# Patient Record
Sex: FEMALE | Race: OTHER | NOT HISPANIC OR LATINO
[De-identification: names, ages, dates, MRNs, and addresses within clinical notes are randomized per-mention and may not be internally consistent; named-entity substitution may affect disease eponyms.]

---

## 2021-04-28 ENCOUNTER — TRANSCRIPTION ENCOUNTER (OUTPATIENT)
Age: 26
End: 2021-04-28

## 2022-01-06 ENCOUNTER — APPOINTMENT (OUTPATIENT)
Dept: OTOLARYNGOLOGY | Facility: CLINIC | Age: 27
End: 2022-01-06

## 2022-01-14 ENCOUNTER — ASOB RESULT (OUTPATIENT)
Age: 27
End: 2022-01-14

## 2022-01-14 ENCOUNTER — APPOINTMENT (OUTPATIENT)
Dept: ANTEPARTUM | Facility: CLINIC | Age: 27
End: 2022-01-14
Payer: COMMERCIAL

## 2022-01-14 PROBLEM — Z00.00 ENCOUNTER FOR PREVENTIVE HEALTH EXAMINATION: Status: ACTIVE | Noted: 2022-01-14

## 2022-01-14 PROCEDURE — 76801 OB US < 14 WKS SINGLE FETUS: CPT

## 2022-01-14 PROCEDURE — 76813 OB US NUCHAL MEAS 1 GEST: CPT

## 2022-01-14 PROCEDURE — 36416 COLLJ CAPILLARY BLOOD SPEC: CPT

## 2022-03-12 ENCOUNTER — APPOINTMENT (OUTPATIENT)
Dept: ANTEPARTUM | Facility: CLINIC | Age: 27
End: 2022-03-12
Payer: COMMERCIAL

## 2022-03-12 ENCOUNTER — ASOB RESULT (OUTPATIENT)
Age: 27
End: 2022-03-12

## 2022-03-12 PROCEDURE — 76811 OB US DETAILED SNGL FETUS: CPT

## 2022-03-29 ENCOUNTER — APPOINTMENT (OUTPATIENT)
Dept: ANTEPARTUM | Facility: CLINIC | Age: 27
End: 2022-03-29
Payer: COMMERCIAL

## 2022-03-29 ENCOUNTER — ASOB RESULT (OUTPATIENT)
Age: 27
End: 2022-03-29

## 2022-03-29 PROCEDURE — 76816 OB US FOLLOW-UP PER FETUS: CPT

## 2022-06-16 ENCOUNTER — APPOINTMENT (OUTPATIENT)
Dept: ANTEPARTUM | Facility: CLINIC | Age: 27
End: 2022-06-16
Payer: COMMERCIAL

## 2022-06-16 ENCOUNTER — ASOB RESULT (OUTPATIENT)
Age: 27
End: 2022-06-16

## 2022-06-16 PROCEDURE — 99202 OFFICE O/P NEW SF 15 MIN: CPT | Mod: 25

## 2022-06-16 PROCEDURE — 76816 OB US FOLLOW-UP PER FETUS: CPT

## 2022-06-16 PROCEDURE — 76819 FETAL BIOPHYS PROFIL W/O NST: CPT

## 2022-07-21 ENCOUNTER — INPATIENT (INPATIENT)
Facility: HOSPITAL | Age: 27
LOS: 4 days | Discharge: HOME | End: 2022-07-26
Attending: OBSTETRICS & GYNECOLOGY | Admitting: OBSTETRICS & GYNECOLOGY

## 2022-07-21 ENCOUNTER — EMERGENCY (EMERGENCY)
Facility: HOSPITAL | Age: 27
LOS: 1 days | Discharge: STILL ADMITTED OR ADMINISTRATIVE | End: 2022-07-21
Admitting: EMERGENCY MEDICINE

## 2022-07-21 VITALS
DIASTOLIC BLOOD PRESSURE: 83 MMHG | RESPIRATION RATE: 16 BRPM | SYSTOLIC BLOOD PRESSURE: 134 MMHG | HEART RATE: 79 BPM | OXYGEN SATURATION: 100 % | TEMPERATURE: 97 F

## 2022-07-21 VITALS
DIASTOLIC BLOOD PRESSURE: 84 MMHG | HEART RATE: 78 BPM | RESPIRATION RATE: 16 BRPM | SYSTOLIC BLOOD PRESSURE: 141 MMHG | TEMPERATURE: 98 F

## 2022-07-21 DIAGNOSIS — Z3A.00 WEEKS OF GESTATION OF PREGNANCY NOT SPECIFIED: ICD-10-CM

## 2022-07-21 DIAGNOSIS — O26.899 OTHER SPECIFIED PREGNANCY RELATED CONDITIONS, UNSPECIFIED TRIMESTER: ICD-10-CM

## 2022-07-21 PROCEDURE — L9996: CPT

## 2022-07-21 NOTE — ED ADULT TRIAGE NOTE - CHIEF COMPLAINT QUOTE
Patient 41 weeks pregnant c/o decrease in fetal movement. EMMANUEL 7/19/22. [facility]. Scheduled for induction Saturday. Denies ROM. Per L&D triage CRISTIANE England, patient to be seen in L&D.

## 2022-07-21 NOTE — OB RN TRIAGE NOTE - CHIEF COMPLAINT QUOTE
"I have numbness and pain in my right hand and shoulder for 2 weeks and I've had decreased fetal movement for the past week"

## 2022-07-22 ENCOUNTER — TRANSCRIPTION ENCOUNTER (OUTPATIENT)
Age: 27
End: 2022-07-22

## 2022-07-22 DIAGNOSIS — O41.00X0 OLIGOHYDRAMNIOS, UNSPECIFIED TRIMESTER, NOT APPLICABLE OR UNSPECIFIED: ICD-10-CM

## 2022-07-22 DIAGNOSIS — O41.03X0 OLIGOHYDRAMNIOS, THIRD TRIMESTER, NOT APPLICABLE OR UNSPECIFIED: ICD-10-CM

## 2022-07-22 LAB
BASOPHILS # BLD AUTO: 0.02 K/UL — SIGNIFICANT CHANGE UP (ref 0–0.2)
BASOPHILS NFR BLD AUTO: 0.2 % — SIGNIFICANT CHANGE UP (ref 0–2)
BLD GP AB SCN SERPL QL: NEGATIVE — SIGNIFICANT CHANGE UP
COVID-19 SPIKE DOMAIN AB INTERP: POSITIVE
COVID-19 SPIKE DOMAIN ANTIBODY RESULT: >250 U/ML — HIGH
EOSINOPHIL # BLD AUTO: 0.19 K/UL — SIGNIFICANT CHANGE UP (ref 0–0.5)
EOSINOPHIL NFR BLD AUTO: 2 % — SIGNIFICANT CHANGE UP (ref 0–6)
HCT VFR BLD CALC: 37.9 % — SIGNIFICANT CHANGE UP (ref 34.5–45)
HGB BLD-MCNC: 12.5 G/DL — SIGNIFICANT CHANGE UP (ref 11.5–15.5)
IANC: 5.86 K/UL — SIGNIFICANT CHANGE UP (ref 1.8–7.4)
IMM GRANULOCYTES NFR BLD AUTO: 0.5 % — SIGNIFICANT CHANGE UP (ref 0–1.5)
LYMPHOCYTES # BLD AUTO: 2.71 K/UL — SIGNIFICANT CHANGE UP (ref 1–3.3)
LYMPHOCYTES # BLD AUTO: 28.5 % — SIGNIFICANT CHANGE UP (ref 13–44)
MCHC RBC-ENTMCNC: 28.5 PG — SIGNIFICANT CHANGE UP (ref 27–34)
MCHC RBC-ENTMCNC: 33 GM/DL — SIGNIFICANT CHANGE UP (ref 32–36)
MCV RBC AUTO: 86.5 FL — SIGNIFICANT CHANGE UP (ref 80–100)
MONOCYTES # BLD AUTO: 0.68 K/UL — SIGNIFICANT CHANGE UP (ref 0–0.9)
MONOCYTES NFR BLD AUTO: 7.2 % — SIGNIFICANT CHANGE UP (ref 2–14)
NEUTROPHILS # BLD AUTO: 5.86 K/UL — SIGNIFICANT CHANGE UP (ref 1.8–7.4)
NEUTROPHILS NFR BLD AUTO: 61.6 % — SIGNIFICANT CHANGE UP (ref 43–77)
NRBC # BLD: 0 /100 WBCS — SIGNIFICANT CHANGE UP
NRBC # FLD: 0 K/UL — SIGNIFICANT CHANGE UP
PLATELET # BLD AUTO: 191 K/UL — SIGNIFICANT CHANGE UP (ref 150–400)
RBC # BLD: 4.38 M/UL — SIGNIFICANT CHANGE UP (ref 3.8–5.2)
RBC # FLD: 14.6 % — HIGH (ref 10.3–14.5)
RH IG SCN BLD-IMP: POSITIVE — SIGNIFICANT CHANGE UP
RH IG SCN BLD-IMP: POSITIVE — SIGNIFICANT CHANGE UP
SARS-COV-2 IGG+IGM SERPL QL IA: >250 U/ML — HIGH
SARS-COV-2 IGG+IGM SERPL QL IA: POSITIVE
SARS-COV-2 RNA SPEC QL NAA+PROBE: SIGNIFICANT CHANGE UP
T PALLIDUM AB TITR SER: NEGATIVE — SIGNIFICANT CHANGE UP
WBC # BLD: 9.51 K/UL — SIGNIFICANT CHANGE UP (ref 3.8–10.5)
WBC # FLD AUTO: 9.51 K/UL — SIGNIFICANT CHANGE UP (ref 3.8–10.5)

## 2022-07-22 RX ORDER — CITRIC ACID/SODIUM CITRATE 300-500 MG
15 SOLUTION, ORAL ORAL EVERY 6 HOURS
Refills: 0 | Status: DISCONTINUED | OUTPATIENT
Start: 2022-07-22 | End: 2022-07-23

## 2022-07-22 RX ORDER — OXYTOCIN 10 UNIT/ML
333.33 VIAL (ML) INJECTION
Qty: 20 | Refills: 0 | Status: DISCONTINUED | OUTPATIENT
Start: 2022-07-22 | End: 2022-07-25

## 2022-07-22 RX ORDER — CHLORHEXIDINE GLUCONATE 213 G/1000ML
1 SOLUTION TOPICAL ONCE
Refills: 0 | Status: COMPLETED | OUTPATIENT
Start: 2022-07-22 | End: 2022-07-23

## 2022-07-22 RX ORDER — SODIUM CHLORIDE 9 MG/ML
1000 INJECTION, SOLUTION INTRAVENOUS
Refills: 0 | Status: DISCONTINUED | OUTPATIENT
Start: 2022-07-22 | End: 2022-07-23

## 2022-07-22 RX ORDER — OXYTOCIN 10 UNIT/ML
2 VIAL (ML) INJECTION
Qty: 30 | Refills: 0 | Status: DISCONTINUED | OUTPATIENT
Start: 2022-07-22 | End: 2022-07-23

## 2022-07-22 RX ADMIN — SODIUM CHLORIDE 125 MILLILITER(S): 9 INJECTION, SOLUTION INTRAVENOUS at 07:50

## 2022-07-22 RX ADMIN — SODIUM CHLORIDE 125 MILLILITER(S): 9 INJECTION, SOLUTION INTRAVENOUS at 02:31

## 2022-07-22 RX ADMIN — Medication 2 MILLIUNIT(S)/MIN: at 18:14

## 2022-07-22 NOTE — OB RN PATIENT PROFILE - NS PRO TDAP CONTRAINDICATIONS
12 y/o F presents to the ED c/o cough. Pt states she was drinking milk and then started coughing. Father states cough sounded like she was barking and her breathing seemed labored. Pt was seen at urgent care earlier and given benadryl. Pt has no known allergies. No fever, chills, n/v/d.
None

## 2022-07-22 NOTE — OB RN PATIENT PROFILE - FALL HARM RISK - UNIVERSAL INTERVENTIONS
Bed in lowest position, wheels locked, appropriate side rails in place/Call bell, personal items and telephone in reach/Instruct patient to call for assistance before getting out of bed or chair/Non-slip footwear when patient is out of bed/[place] to call system/Physically safe environment - no spills, clutter or unnecessary equipment/Purposeful Proactive Rounding/Room/bathroom lighting operational, light cord in reach

## 2022-07-22 NOTE — OB PROVIDER H&P - NSHPPHYSICALEXAM_GEN_ALL_CORE
T(C): 36.8 (07-22-22 @ 00:47), Max: 36.8 (07-21-22 @ 21:34)  HR: 73 (07-22-22 @ 00:47) (68 - 79)  BP: 125/83 (07-22-22 @ 00:47) (114/66 - 141/84)  RR: 16 (07-22-22 @ 00:47) (16 - 16)  SpO2: 100% (07-21-22 @ 21:13) (100% - 100%)    Heart: RRR  Lungs: CTA  Abdomen: Gravid, soft, NT    NST: Reactive with moderate variability, Category 1 tracing  [place]: Irregular contractions  VE: 1/70/-3, intact membranes  TAS: SLIUP, Cephalic, NAPOLEON: 2.5

## 2022-07-22 NOTE — OB PROVIDER H&P - PROBLEM SELECTOR PLAN 1
-Admit to labor and delivery for induction of labor  -Pain Management prn  -Cont EFM/[place]  -Admission labs: CBC, RPR, T&S  -IV hydration  -Clear liquid diet  -Cytotec PO for IOL

## 2022-07-22 NOTE — OB PROVIDER IHI INDUCTION/AUGMENTATION NOTE - NS_OBIHIREPANPSATTEST_OBGYN_ALL_OB
I have discussed with the Attending the patient's status, as well as the H&P and the fetal status. The Attending agreed with the suggested management.
No significant past surgical history

## 2022-07-22 NOTE — OB PROVIDER H&P - HISTORY OF PRESENT ILLNESS
27y  at 40w3d presents to triage c/o decreased fetal movements for the past week but patient was evaluated by OB on Monday.  Reports +FM, no vaginal bleeding, no ROM or LOF  Prenatal care: Dr Tompkins, denies any prenatal complications.  GBS: Negative 22

## 2022-07-22 NOTE — OB PROVIDER H&P - ASSESSMENT
27y  at 40w3d with oligohydramnios, NAPOLEON: 2.5   GBS: Negative 22, Repeat GBS done 7/15/22 pending  D/w Dr Tompkins  -Admit to labor and delivery for induction of labor  -Pain Management prn  -Cont EFM/[place]  -Admission labs: CBC, RPR, T&S  -IV hydration  -Clear liquid diet  -Cytotec PO for IOL

## 2022-07-23 ENCOUNTER — TRANSCRIPTION ENCOUNTER (OUTPATIENT)
Age: 27
End: 2022-07-23

## 2022-07-23 RX ORDER — FAMOTIDINE 10 MG/ML
20 INJECTION INTRAVENOUS ONCE
Refills: 0 | Status: COMPLETED | OUTPATIENT
Start: 2022-07-23 | End: 2022-07-23

## 2022-07-23 RX ORDER — OXYTOCIN 10 UNIT/ML
333.33 VIAL (ML) INJECTION
Qty: 20 | Refills: 0 | Status: DISCONTINUED | OUTPATIENT
Start: 2022-07-23 | End: 2022-07-25

## 2022-07-23 RX ORDER — SODIUM CHLORIDE 9 MG/ML
1000 INJECTION INTRAMUSCULAR; INTRAVENOUS; SUBCUTANEOUS
Refills: 0 | Status: DISCONTINUED | OUTPATIENT
Start: 2022-07-23 | End: 2022-07-23

## 2022-07-23 RX ORDER — SODIUM CHLORIDE 9 MG/ML
1000 INJECTION, SOLUTION INTRAVENOUS
Refills: 0 | Status: DISCONTINUED | OUTPATIENT
Start: 2022-07-23 | End: 2022-07-26

## 2022-07-23 RX ORDER — KETOROLAC TROMETHAMINE 30 MG/ML
30 SYRINGE (ML) INJECTION EVERY 6 HOURS
Refills: 0 | Status: DISCONTINUED | OUTPATIENT
Start: 2022-07-23 | End: 2022-07-24

## 2022-07-23 RX ORDER — SIMETHICONE 80 MG/1
80 TABLET, CHEWABLE ORAL EVERY 4 HOURS
Refills: 0 | Status: DISCONTINUED | OUTPATIENT
Start: 2022-07-23 | End: 2022-07-26

## 2022-07-23 RX ORDER — OXYCODONE HYDROCHLORIDE 5 MG/1
5 TABLET ORAL
Refills: 0 | Status: COMPLETED | OUTPATIENT
Start: 2022-07-23 | End: 2022-07-30

## 2022-07-23 RX ORDER — ONDANSETRON 8 MG/1
4 TABLET, FILM COATED ORAL ONCE
Refills: 0 | Status: DISCONTINUED | OUTPATIENT
Start: 2022-07-23 | End: 2022-07-23

## 2022-07-23 RX ORDER — DIPHENHYDRAMINE HCL 50 MG
25 CAPSULE ORAL EVERY 6 HOURS
Refills: 0 | Status: DISCONTINUED | OUTPATIENT
Start: 2022-07-23 | End: 2022-07-26

## 2022-07-23 RX ORDER — ACETAMINOPHEN 500 MG
975 TABLET ORAL
Refills: 0 | Status: DISCONTINUED | OUTPATIENT
Start: 2022-07-23 | End: 2022-07-26

## 2022-07-23 RX ORDER — SODIUM CHLORIDE 9 MG/ML
300 INJECTION INTRAMUSCULAR; INTRAVENOUS; SUBCUTANEOUS ONCE
Refills: 0 | Status: COMPLETED | OUTPATIENT
Start: 2022-07-23 | End: 2022-07-23

## 2022-07-23 RX ORDER — MAGNESIUM HYDROXIDE 400 MG/1
30 TABLET, CHEWABLE ORAL
Refills: 0 | Status: DISCONTINUED | OUTPATIENT
Start: 2022-07-23 | End: 2022-07-26

## 2022-07-23 RX ORDER — OXYCODONE HYDROCHLORIDE 5 MG/1
5 TABLET ORAL ONCE
Refills: 0 | Status: DISCONTINUED | OUTPATIENT
Start: 2022-07-23 | End: 2022-07-26

## 2022-07-23 RX ORDER — CITRIC ACID/SODIUM CITRATE 300-500 MG
30 SOLUTION, ORAL ORAL ONCE
Refills: 0 | Status: COMPLETED | OUTPATIENT
Start: 2022-07-23 | End: 2022-07-23

## 2022-07-23 RX ORDER — HEPARIN SODIUM 5000 [USP'U]/ML
5000 INJECTION INTRAVENOUS; SUBCUTANEOUS EVERY 12 HOURS
Refills: 0 | Status: DISCONTINUED | OUTPATIENT
Start: 2022-07-23 | End: 2022-07-26

## 2022-07-23 RX ORDER — LANOLIN
1 OINTMENT (GRAM) TOPICAL EVERY 6 HOURS
Refills: 0 | Status: DISCONTINUED | OUTPATIENT
Start: 2022-07-23 | End: 2022-07-26

## 2022-07-23 RX ORDER — TETANUS TOXOID, REDUCED DIPHTHERIA TOXOID AND ACELLULAR PERTUSSIS VACCINE, ADSORBED 5; 2.5; 8; 8; 2.5 [IU]/.5ML; [IU]/.5ML; UG/.5ML; UG/.5ML; UG/.5ML
0.5 SUSPENSION INTRAMUSCULAR ONCE
Refills: 0 | Status: DISCONTINUED | OUTPATIENT
Start: 2022-07-23 | End: 2022-07-26

## 2022-07-23 RX ORDER — IBUPROFEN 200 MG
600 TABLET ORAL EVERY 6 HOURS
Refills: 0 | Status: COMPLETED | OUTPATIENT
Start: 2022-07-23 | End: 2023-06-21

## 2022-07-23 RX ORDER — OXYTOCIN 10 UNIT/ML
111 VIAL (ML) INJECTION
Qty: 20 | Refills: 0 | Status: DISCONTINUED | OUTPATIENT
Start: 2022-07-23 | End: 2022-07-25

## 2022-07-23 RX ADMIN — CHLORHEXIDINE GLUCONATE 1 APPLICATION(S): 213 SOLUTION TOPICAL at 08:23

## 2022-07-23 RX ADMIN — SODIUM CHLORIDE 300 MILLILITER(S): 9 INJECTION INTRAMUSCULAR; INTRAVENOUS; SUBCUTANEOUS at 05:00

## 2022-07-23 RX ADMIN — Medication 0.25 MILLIGRAM(S): at 05:34

## 2022-07-23 RX ADMIN — FAMOTIDINE 20 MILLIGRAM(S): 10 INJECTION INTRAVENOUS at 08:20

## 2022-07-23 RX ADMIN — Medication 30 MILLIGRAM(S): at 18:15

## 2022-07-23 RX ADMIN — Medication 30 MILLILITER(S): at 08:50

## 2022-07-23 RX ADMIN — HEPARIN SODIUM 5000 UNIT(S): 5000 INJECTION INTRAVENOUS; SUBCUTANEOUS at 18:15

## 2022-07-23 RX ADMIN — SODIUM CHLORIDE 125 MILLILITER(S): 9 INJECTION INTRAMUSCULAR; INTRAVENOUS; SUBCUTANEOUS at 05:31

## 2022-07-23 RX ADMIN — SIMETHICONE 80 MILLIGRAM(S): 80 TABLET, CHEWABLE ORAL at 22:39

## 2022-07-23 RX ADMIN — Medication 30 MILLIGRAM(S): at 19:52

## 2022-07-23 RX ADMIN — Medication 333 MILLIUNIT(S)/MIN: at 10:11

## 2022-07-23 RX ADMIN — Medication 975 MILLIGRAM(S): at 20:36

## 2022-07-23 RX ADMIN — Medication 975 MILLIGRAM(S): at 21:15

## 2022-07-23 NOTE — OB PROVIDER DELIVERY SUMMARY - NSSELHIDDEN_OBGYN_ALL_OB_FT
[NS_DeliveryRN_OBGYN_ALL_OB_FT:[id number]],[NS_CirculateRN2_OBGYN_ALL_OB_FT:[id number]],[NS_DeliveryAttending1_OBGYN_ALL_OB_FT:[id number]==],[NS_DeliveryAssist1_OBGYN_ALL_OB_FT:[id number]=]

## 2022-07-23 NOTE — OB RN INTRAOPERATIVE NOTE - NSSELHIDDEN_OBGYN_ALL_OB_FT
[NS_DeliveryRN_OBGYN_ALL_OB_FT:[id number]=],[NS_CirculateRN2_OBGYN_ALL_OB_FT:[id number]] [NS_DeliveryRN_OBGYN_ALL_OB_FT:[id number]],[NS_CirculateRN2_OBGYN_ALL_OB_FT:[id number]],[NS_DeliveryAttending1_OBGYN_ALL_OB_FT:[id number]],[NS_DeliveryAssist1_OBGYN_ALL_OB_FT:[id number]=]

## 2022-07-23 NOTE — DISCHARGE NOTE OB - MEDICATION SUMMARY - MEDICATIONS TO TAKE
I will START or STAY ON the medications listed below when I get home from the hospital:    acetaminophen 325 mg oral tablet  -- 3 tab(s) by mouth every 6 hours, As Needed  -- Indication: For postpartum    ibuprofen 600 mg oral tablet  -- 1 tab(s) by mouth every 6 hours, As Needed  -- Indication: For postpartum    ferrous sulfate 325 mg (65 mg elemental iron) oral tablet  -- 1 tab(s) by mouth once a day  -- Indication: For anemia    PNV Prenatal oral tablet  -- 1 tab(s) by mouth once a day  -- Indication: For postpartum    ascorbic acid 500 mg oral tablet  -- 1 tab(s) by mouth once a day  -- Indication: For anemia   I will START or STAY ON the medications listed below when I get home from the hospital:    acetaminophen 325 mg oral tablet  -- 3 tab(s) by mouth every 6 hours, As Needed  -- Indication: For Single delivery by  section    ibuprofen 600 mg oral tablet  -- 1 tab(s) by mouth every 6 hours, As Needed  -- Indication: For Single delivery by  section    PNV Prenatal oral tablet  -- 1 tab(s) by mouth once a day  -- Indication: For postpartum    ferrous sulfate 325 mg (65 mg elemental iron) oral tablet  -- 1 tab(s) by mouth once a day  -- Indication: For anemia    ascorbic acid 500 mg oral tablet  -- 1 tab(s) by mouth once a day  -- Indication: For anemia

## 2022-07-23 NOTE — PROVIDER CONTACT NOTE (OTHER) - ASSESSMENT
pt's vitals temp: 99.2, HR; 86, BP: 126/78, SPo2: 100%, RR: 18. pt is asymptomatic denied dizziness, fatigue, and SOB, pt's fundus was firm and midline, pt's 1st void was 700 ml more a mixed of lochia than urine

## 2022-07-23 NOTE — OB NEONATOLOGY/PEDIATRICIAN DELIVERY SUMMARY - NSPEDSNEONOTESA_OBGYN_ALL_OB_FT
Peds was called for this 40.4 week gestation baby girl delivery via primary C/Section for Cat II tracing. Mother is 25 year old , O+, unremarkable prenatal labs, GBS negative on 7/15, COVID negative on . Mother was admitted on  for IOL for oligo (NAPOLEON 2.5), decreased fetal movement x 1 week. Maternal history significant for HSV+, no outbreak, on valtrex. AROM at 0415 on  with clear fluids. Highest maternal temp 36.9. EOS : 0.1. Baby emerged vigorous with spontaneous cry, dried, stimulated, warmed, and suctioned mouth and nose. Apgar 8/9. At approximately 5 MOL baby required CPAP 5/21% for sats in 70's, nasal flaring, and grunting. Continued CPAP for ~30 MOL, weaned off with sats > 96%, no grunting or nasal flaring. Baby to NBN. Mother wants to breast-feed, NO Hep B, and peds is [place].

## 2022-07-23 NOTE — DISCHARGE NOTE OB - PLAN OF CARE
labor.    arrest of dilation  cat 2 tracing remote from delivery  primary CS Cont Iron daily with vitamin C  Cont methergine series as directed  Monitor vital signs  Rpt CBC in 4hrs Cont Iron daily with vitamin C

## 2022-07-23 NOTE — DISCHARGE NOTE OB - CARE PROVIDER_API CALL
Alen العراقي)  Obstetrics and Gynecology  [address] [place] [address]  Phone: [phone]  Fax: [phone]  Follow Up Time:

## 2022-07-23 NOTE — OB PROVIDER LABOR PROGRESS NOTE - NS_OBIHIFHRDETAILS_OBGYN_ALL_OB_FT
135 mod shahab/-accels/+decelerations
135 mod shahab/+accels/+decels
135 mod shahab/-accels/+decels
baseline: 150  moderate variability  accels: absent  decels: absent    Cat 1

## 2022-07-23 NOTE — DISCHARGE NOTE OB - CARE PLAN
Assessment and plan of treatment:	labor.    arrest of dilation  cat 2 tracing remote from delivery  primary CS   Principal Discharge DX:	Single delivery by  section  Assessment and plan of treatment:	labor.    arrest of dilation  cat 2 tracing remote from delivery  primary CS  Secondary Diagnosis:	Anemia due to acute blood loss  Assessment and plan of treatment:	Cont Iron daily with vitamin C  Cont methergine series as directed  Monitor vital signs  Rpt CBC in 4hrs   1 Principal Discharge DX:	Single delivery by  section  Assessment and plan of treatment:	labor.    arrest of dilation  cat 2 tracing remote from delivery  primary CS  Secondary Diagnosis:	Anemia due to acute blood loss  Assessment and plan of treatment:	Cont Iron daily with vitamin C

## 2022-07-23 NOTE — PROVIDER CONTACT NOTE (OTHER) - SITUATION
RN and pca was assisting the pt to use the bathroom after being in bed for a few hours, upon standing the pt felt a gush of blood and felt a clot about the size of a grape passed

## 2022-07-23 NOTE — OB RN DELIVERY SUMMARY - NS_SEPSISRSKCALC_OBGYN_ALL_OB_FT
EOS calculated successfully. EOS Risk Factor: 0.5/1000 live births ([facility] national incidence); GA=40w4d; Temp=98.42; ROM=5.117; GBS='Unknown'; Antibiotics='No antibiotics or any antibiotics < 2 hrs prior to birth'

## 2022-07-23 NOTE — OB PROVIDER DELIVERY SUMMARY - NSPROVIDERDELIVERYNOTE_OBGYN_ALL_OB_FT
Patient admitted for oligohydramnios induction.  Taken for pLTCS for Cat 2 tracing. Live female  delivery via LUST incision. Apgars 8/9. Weight= 6#8, cephalic presentation. Cord clamped and cut. Cord gas sent. Placenta delivered intact. Hysterotomy closed with caprosyn in 1 layer.  Fascia closed with 0-vicryl. Skin closed with vicryl. QBL 254cc, IVF 1250 mL, UOP 70cc.

## 2022-07-23 NOTE — OB PROVIDER LABOR PROGRESS NOTE - ASSESSMENT
G1PO @40.4 oligo IOL  Cervical change noted  Pitocin discontinued   Patient repositioned to left lateral  IV bolus initiated   MD Arnett made aware of FHR tracing  Consider AROM on next exam   Improvement noted in FHR tracing with intrauterine resuscitation  Pitocin to be restarted when FHR tracing improves/according to [facility] policy  Will closely monitor and reassess PRN      Yayo VALENTIN  dw MD Arnett
Patient has made some cervical change. Cervical balloon placed with minimal discomfort to patient.   - continue toco  - continue FHR monitoring    D/w Dr. Shilo Smith PGY1
No cervical change noted  AROM clear fluid  Continue with pitocin; currently @2mu  Intermittent variable decelerations noted-FHR tracing overall reassuring   Patient repositioned to left lateral  Consider IUPC with amnioinfusion if variable decelerations persist  Will continue to closely monitor    Yayo Arnett  
No cervical change noted  Prolonged deceleration x 3 minutes  ISE placed  MD Arnett and MD uJrado at bedside  Patient positioned to left lateral  Improvement noted with intrauterine resuscitation  MD Arnett remains at bedside    Yayo NP

## 2022-07-23 NOTE — DISCHARGE NOTE OB - NS MD DC FALL RISK RISK
For information on Fall & Injury Prevention, visit: [email or web address] OR  [email or web address] OR  https://www.cdc.gov/steadi/patient.html

## 2022-07-23 NOTE — DISCHARGE NOTE OB - MEDICATION SUMMARY - MEDICATIONS TO STOP TAKING
I will STOP taking the medications listed below when I get home from the hospital:    Valtrex 500 mg oral tablet  -- orally 2 times a day

## 2022-07-23 NOTE — OB PROVIDER LABOR PROGRESS NOTE - NS_SUBJECTIVE/OBJECTIVE_OBGYN_ALL_OB_FT
Paged to bedside by RN for FHR deceleration
Patient seen and examined for AROM
Patient seen for cervical exam. FHR decelerations noted. Effective epidural in place. No complaints at this time.  VS  T(C): 36.5 (07-22-22 @ 22:12)  HR: 63 (07-23-22 @ 01:18)  BP: 110/59 (07-23-22 @ 01:12)  RR: --  SpO2: 92% (07-23-22 @ 01:26)
Pt seen to assess cervical change and place cervical balloon.

## 2022-07-23 NOTE — DISCHARGE NOTE OB - PATIENT PORTAL LINK FT
You can access the FollowMyHealth Patient Portal offered by [facility] by registering at the following website: [email or web address]. By joining [employer]’s FollowMyHealth portal, you will also be able to view your health information using other applications (apps) compatible with our system.

## 2022-07-23 NOTE — OB RN DELIVERY SUMMARY - NSSELHIDDEN_OBGYN_ALL_OB_FT
[NS_DeliveryRN_OBGYN_ALL_OB_FT:[id number]],[NS_CirculateRN2_OBGYN_ALL_OB_FT:[id number]==],[NS_DeliveryAttending1_OBGYN_ALL_OB_FT:[id number]],[NS_DeliveryAssist1_OBGYN_ALL_OB_FT:[id number]=]

## 2022-07-23 NOTE — DISCHARGE NOTE OB - MATERIALS PROVIDED
Vaccinations/[facility]  Screening Program/  Immunization Record/Breastfeeding Log/Breastfeeding Mother’s Support Group Information/Guide to Postpartum Care/[facility] Hearing Screen Program/Back To Sleep Handout/Shaken Baby Prevention Handout/Breastfeeding Guide and Packet/Birth Certificate Instructions

## 2022-07-24 LAB
BASOPHILS # BLD AUTO: 0.03 K/UL — SIGNIFICANT CHANGE UP (ref 0–0.2)
BASOPHILS NFR BLD AUTO: 0.2 % — SIGNIFICANT CHANGE UP (ref 0–2)
EOSINOPHIL # BLD AUTO: 0.12 K/UL — SIGNIFICANT CHANGE UP (ref 0–0.5)
EOSINOPHIL NFR BLD AUTO: 1 % — SIGNIFICANT CHANGE UP (ref 0–6)
HCT VFR BLD CALC: 22.3 % — LOW (ref 34.5–45)
HCT VFR BLD CALC: 22.4 % — LOW (ref 34.5–45)
HCT VFR BLD CALC: 23.8 % — LOW (ref 34.5–45)
HGB BLD-MCNC: 7.2 G/DL — LOW (ref 11.5–15.5)
HGB BLD-MCNC: 7.3 G/DL — LOW (ref 11.5–15.5)
HGB BLD-MCNC: 8.1 G/DL — LOW (ref 11.5–15.5)
IANC: 8.4 K/UL — HIGH (ref 1.8–7.4)
IMM GRANULOCYTES NFR BLD AUTO: 0.6 % — SIGNIFICANT CHANGE UP (ref 0–1.5)
LYMPHOCYTES # BLD AUTO: 24.7 % — SIGNIFICANT CHANGE UP (ref 13–44)
LYMPHOCYTES # BLD AUTO: 3.11 K/UL — SIGNIFICANT CHANGE UP (ref 1–3.3)
MCHC RBC-ENTMCNC: 29 PG — SIGNIFICANT CHANGE UP (ref 27–34)
MCHC RBC-ENTMCNC: 29.1 PG — SIGNIFICANT CHANGE UP (ref 27–34)
MCHC RBC-ENTMCNC: 30.1 PG — SIGNIFICANT CHANGE UP (ref 27–34)
MCHC RBC-ENTMCNC: 32.3 GM/DL — SIGNIFICANT CHANGE UP (ref 32–36)
MCHC RBC-ENTMCNC: 32.6 GM/DL — SIGNIFICANT CHANGE UP (ref 32–36)
MCHC RBC-ENTMCNC: 34 GM/DL — SIGNIFICANT CHANGE UP (ref 32–36)
MCV RBC AUTO: 88.5 FL — SIGNIFICANT CHANGE UP (ref 80–100)
MCV RBC AUTO: 89.2 FL — SIGNIFICANT CHANGE UP (ref 80–100)
MCV RBC AUTO: 89.9 FL — SIGNIFICANT CHANGE UP (ref 80–100)
MONOCYTES # BLD AUTO: 0.87 K/UL — SIGNIFICANT CHANGE UP (ref 0–0.9)
MONOCYTES NFR BLD AUTO: 6.9 % — SIGNIFICANT CHANGE UP (ref 2–14)
NEUTROPHILS # BLD AUTO: 8.4 K/UL — HIGH (ref 1.8–7.4)
NEUTROPHILS NFR BLD AUTO: 66.6 % — SIGNIFICANT CHANGE UP (ref 43–77)
NRBC # BLD: 0 /100 WBCS — SIGNIFICANT CHANGE UP
NRBC # FLD: 0 K/UL — SIGNIFICANT CHANGE UP
PLATELET # BLD AUTO: 131 K/UL — LOW (ref 150–400)
PLATELET # BLD AUTO: 143 K/UL — LOW (ref 150–400)
PLATELET # BLD AUTO: 143 K/UL — LOW (ref 150–400)
RBC # BLD: 2.48 M/UL — LOW (ref 3.8–5.2)
RBC # BLD: 2.51 M/UL — LOW (ref 3.8–5.2)
RBC # BLD: 2.69 M/UL — LOW (ref 3.8–5.2)
RBC # FLD: 14.4 % — SIGNIFICANT CHANGE UP (ref 10.3–14.5)
RBC # FLD: 14.4 % — SIGNIFICANT CHANGE UP (ref 10.3–14.5)
RBC # FLD: 14.5 % — SIGNIFICANT CHANGE UP (ref 10.3–14.5)
WBC # BLD: 10.9 K/UL — HIGH (ref 3.8–10.5)
WBC # BLD: 12.6 K/UL — HIGH (ref 3.8–10.5)
WBC # BLD: 14.06 K/UL — HIGH (ref 3.8–10.5)
WBC # FLD AUTO: 10.9 K/UL — HIGH (ref 3.8–10.5)
WBC # FLD AUTO: 12.6 K/UL — HIGH (ref 3.8–10.5)
WBC # FLD AUTO: 14.06 K/UL — HIGH (ref 3.8–10.5)

## 2022-07-24 RX ORDER — VALACYCLOVIR 500 MG/1
0 TABLET, FILM COATED ORAL
Qty: 0 | Refills: 0 | DISCHARGE

## 2022-07-24 RX ORDER — ASCORBIC ACID 60 MG
500 TABLET,CHEWABLE ORAL DAILY
Refills: 0 | Status: DISCONTINUED | OUTPATIENT
Start: 2022-07-24 | End: 2022-07-26

## 2022-07-24 RX ORDER — ASCORBIC ACID 60 MG
1 TABLET,CHEWABLE ORAL
Qty: 0 | Refills: 0 | DISCHARGE
Start: 2022-07-24

## 2022-07-24 RX ORDER — IBUPROFEN 200 MG
1 TABLET ORAL
Qty: 0 | Refills: 0 | DISCHARGE
Start: 2022-07-24

## 2022-07-24 RX ORDER — FERROUS SULFATE 325(65) MG
1 TABLET ORAL
Qty: 0 | Refills: 0 | DISCHARGE
Start: 2022-07-24

## 2022-07-24 RX ORDER — FERROUS SULFATE 325(65) MG
325 TABLET ORAL DAILY
Refills: 0 | Status: DISCONTINUED | OUTPATIENT
Start: 2022-07-24 | End: 2022-07-25

## 2022-07-24 RX ORDER — ACETAMINOPHEN 500 MG
3 TABLET ORAL
Qty: 0 | Refills: 0 | DISCHARGE
Start: 2022-07-24

## 2022-07-24 RX ORDER — IBUPROFEN 200 MG
600 TABLET ORAL EVERY 6 HOURS
Refills: 0 | Status: DISCONTINUED | OUTPATIENT
Start: 2022-07-24 | End: 2022-07-26

## 2022-07-24 RX ADMIN — Medication 600 MILLIGRAM(S): at 00:58

## 2022-07-24 RX ADMIN — Medication 0.2 MILLIGRAM(S): at 08:48

## 2022-07-24 RX ADMIN — Medication 500 MILLIGRAM(S): at 11:53

## 2022-07-24 RX ADMIN — Medication 975 MILLIGRAM(S): at 04:52

## 2022-07-24 RX ADMIN — Medication 30 MILLIGRAM(S): at 00:29

## 2022-07-24 RX ADMIN — Medication 975 MILLIGRAM(S): at 11:47

## 2022-07-24 RX ADMIN — Medication 0.2 MILLIGRAM(S): at 00:29

## 2022-07-24 RX ADMIN — Medication 0.2 MILLIGRAM(S): at 11:54

## 2022-07-24 RX ADMIN — Medication 975 MILLIGRAM(S): at 21:08

## 2022-07-24 RX ADMIN — Medication 0.2 MILLIGRAM(S): at 04:52

## 2022-07-24 RX ADMIN — Medication 30 MILLIGRAM(S): at 06:12

## 2022-07-24 RX ADMIN — Medication 0.2 MILLIGRAM(S): at 15:57

## 2022-07-24 RX ADMIN — Medication 0.2 MILLIGRAM(S): at 21:09

## 2022-07-24 RX ADMIN — Medication 30 MILLIGRAM(S): at 11:54

## 2022-07-24 RX ADMIN — Medication 975 MILLIGRAM(S): at 10:53

## 2022-07-24 RX ADMIN — Medication 600 MILLIGRAM(S): at 18:12

## 2022-07-24 RX ADMIN — HEPARIN SODIUM 5000 UNIT(S): 5000 INJECTION INTRAVENOUS; SUBCUTANEOUS at 06:13

## 2022-07-24 RX ADMIN — Medication 30 MILLIGRAM(S): at 12:35

## 2022-07-24 RX ADMIN — Medication 325 MILLIGRAM(S): at 11:53

## 2022-07-24 RX ADMIN — Medication 30 MILLIGRAM(S): at 01:15

## 2022-07-24 RX ADMIN — Medication 975 MILLIGRAM(S): at 21:38

## 2022-07-24 RX ADMIN — Medication 975 MILLIGRAM(S): at 05:35

## 2022-07-24 RX ADMIN — HEPARIN SODIUM 5000 UNIT(S): 5000 INJECTION INTRAVENOUS; SUBCUTANEOUS at 18:13

## 2022-07-24 NOTE — PROGRESS NOTE ADULT - ASSESSMENT
A/P: 27y POD#1 c/b post-partum bleeding of approximately 350cc.  - Continue regular diet.  - Encourage ambulation  - Continue motrin, tylenol, oxycodone PRN for pain control.    #Post-partum bleeding  - Patient continues to be asx w/ reassuring VS  - c/w Methergine series  - AM CBC 7.2/22.3 (8.1/23.8 from 0035). Will continue to monitor VS and address w/ day team     Sukumar Gutierrez, PGY-1  Ob/Gyn

## 2022-07-24 NOTE — PROGRESS NOTE ADULT - SUBJECTIVE AND OBJECTIVE BOX
Postop Day  __1_ s/p   C- Section    THERAPY:  [  ] Spinal morphine   [  x] Epidural morphine   [  ] IV PCA Hydromorphone 1 mg/ml      Sedation Score:	  [  ] Alert	    [  ] Drowsy        [  ] Arousable	[  ] Asleep	[  ] Unresponsive    Side Effects:	  [  ] None	     [  ] Nausea        [  ] Pruritus        [  ] Weakness   [  ] Numbness        ASSESSMENT/ PLAN   [   ] Discontinue         [  ] Continue  [ x ]Documentation and Verification of current medications

## 2022-07-24 NOTE — PROGRESS NOTE ADULT - SUBJECTIVE AND OBJECTIVE BOX
OB Progress Note:  Delivery, POD#1    S: 27y POD#1 s/p pLTCS c/b post-partum bleeding of approximately 350cc (refer to chart note for details). Pain controlled. Tolerating a regular diet and passing flatus. Denies n/v, chest pain, shortness of breath, or lightheadedness/dizziness. Voiding spontaneously and ambulating w/o difficulty. Says she passed a clot since prior eval, but says that bleeding has overall improved     O:   Vital Signs Last 24 Hrs  T(C): 36.9 (2022 05:04), Max: 37.2 (2022 01:15)  T(F): 98.4 (2022 05:04), Max: 98.9 (2022 01:15)  HR: 79 (2022 05:04) (74 - 99)  BP: 103/52 (2022 05:04) (103/52 - 132/81)  BP(mean): 84 (2022 12:30) (73 - 93)  RR: 18 (2022 05:04) (16 - 24)  SpO2: 100% (2022 05:04) (89% - 100%)    Parameters below as of 2022 05:04  Patient On (Oxygen Delivery Method): room air        Labs:  Blood type: O Positive  Rubella IgG: RPR: Negative                          7.2<L>   12.60<H> >-----------< 131<L>    (  @ 05:50 )             22.3<L>                        8.1<L>   14.06<H> >-----------< 143<L>    (  @ 00:35 )             23.8<L>                        12.5   9.51 >-----------< 191    (  @ 00:25 )             37.9          PE:  General: No acute distress. ~50cc clot in restroom  Pulm: Unlabored breathing. No respiratory distress  Abdomen: Soft. Non-tender. Incision c/d/i. Uterus firm   : Pad w/ old blood. No active bleeding w/ palpation of uterus  Extremities: No calf tenderness bilaterally

## 2022-07-25 LAB
BASOPHILS # BLD AUTO: 0.02 K/UL — SIGNIFICANT CHANGE UP (ref 0–0.2)
BASOPHILS NFR BLD AUTO: 0.2 % — SIGNIFICANT CHANGE UP (ref 0–2)
BLD GP AB SCN SERPL QL: NEGATIVE — SIGNIFICANT CHANGE UP
EOSINOPHIL # BLD AUTO: 0.22 K/UL — SIGNIFICANT CHANGE UP (ref 0–0.5)
EOSINOPHIL NFR BLD AUTO: 2.5 % — SIGNIFICANT CHANGE UP (ref 0–6)
HCT VFR BLD CALC: 20.1 % — CRITICAL LOW (ref 34.5–45)
HCT VFR BLD CALC: 26.1 % — LOW (ref 34.5–45)
HCT VFR BLD CALC: 26.4 % — LOW (ref 34.5–45)
HCT VFR BLD CALC: 28.7 % — LOW (ref 34.5–45)
HGB BLD-MCNC: 6.4 G/DL — CRITICAL LOW (ref 11.5–15.5)
HGB BLD-MCNC: 8.5 G/DL — LOW (ref 11.5–15.5)
HGB BLD-MCNC: 8.9 G/DL — LOW (ref 11.5–15.5)
HGB BLD-MCNC: 9.6 G/DL — LOW (ref 11.5–15.5)
IANC: 4.84 K/UL — SIGNIFICANT CHANGE UP (ref 1.8–7.4)
IMM GRANULOCYTES NFR BLD AUTO: 1.2 % — SIGNIFICANT CHANGE UP (ref 0–1.5)
LYMPHOCYTES # BLD AUTO: 3.2 K/UL — SIGNIFICANT CHANGE UP (ref 1–3.3)
LYMPHOCYTES # BLD AUTO: 35.9 % — SIGNIFICANT CHANGE UP (ref 13–44)
MCHC RBC-ENTMCNC: 28.8 PG — SIGNIFICANT CHANGE UP (ref 27–34)
MCHC RBC-ENTMCNC: 29.2 PG — SIGNIFICANT CHANGE UP (ref 27–34)
MCHC RBC-ENTMCNC: 29.4 PG — SIGNIFICANT CHANGE UP (ref 27–34)
MCHC RBC-ENTMCNC: 30 PG — SIGNIFICANT CHANGE UP (ref 27–34)
MCHC RBC-ENTMCNC: 31.8 GM/DL — LOW (ref 32–36)
MCHC RBC-ENTMCNC: 32.2 GM/DL — SIGNIFICANT CHANGE UP (ref 32–36)
MCHC RBC-ENTMCNC: 33.4 GM/DL — SIGNIFICANT CHANGE UP (ref 32–36)
MCHC RBC-ENTMCNC: 34.1 GM/DL — SIGNIFICANT CHANGE UP (ref 32–36)
MCV RBC AUTO: 87.9 FL — SIGNIFICANT CHANGE UP (ref 80–100)
MCV RBC AUTO: 88 FL — SIGNIFICANT CHANGE UP (ref 80–100)
MCV RBC AUTO: 90.5 FL — SIGNIFICANT CHANGE UP (ref 80–100)
MCV RBC AUTO: 90.7 FL — SIGNIFICANT CHANGE UP (ref 80–100)
MONOCYTES # BLD AUTO: 0.52 K/UL — SIGNIFICANT CHANGE UP (ref 0–0.9)
MONOCYTES NFR BLD AUTO: 5.8 % — SIGNIFICANT CHANGE UP (ref 2–14)
NEUTROPHILS # BLD AUTO: 4.84 K/UL — SIGNIFICANT CHANGE UP (ref 1.8–7.4)
NEUTROPHILS NFR BLD AUTO: 54.4 % — SIGNIFICANT CHANGE UP (ref 43–77)
NRBC # BLD: 0 /100 WBCS — SIGNIFICANT CHANGE UP
NRBC # FLD: 0 K/UL — SIGNIFICANT CHANGE UP
NRBC # FLD: 0 K/UL — SIGNIFICANT CHANGE UP
NRBC # FLD: 0.02 K/UL — HIGH
NRBC # FLD: 0.02 K/UL — HIGH
PLATELET # BLD AUTO: 136 K/UL — LOW (ref 150–400)
PLATELET # BLD AUTO: 156 K/UL — SIGNIFICANT CHANGE UP (ref 150–400)
PLATELET # BLD AUTO: 161 K/UL — SIGNIFICANT CHANGE UP (ref 150–400)
PLATELET # BLD AUTO: 163 K/UL — SIGNIFICANT CHANGE UP (ref 150–400)
RBC # BLD: 2.22 M/UL — LOW (ref 3.8–5.2)
RBC # BLD: 2.91 M/UL — LOW (ref 3.8–5.2)
RBC # BLD: 2.97 M/UL — LOW (ref 3.8–5.2)
RBC # BLD: 3.26 M/UL — LOW (ref 3.8–5.2)
RBC # FLD: 14.5 % — SIGNIFICANT CHANGE UP (ref 10.3–14.5)
RBC # FLD: 14.6 % — HIGH (ref 10.3–14.5)
RBC # FLD: 14.6 % — HIGH (ref 10.3–14.5)
RBC # FLD: 14.7 % — HIGH (ref 10.3–14.5)
RH IG SCN BLD-IMP: POSITIVE — SIGNIFICANT CHANGE UP
WBC # BLD: 10.32 K/UL — SIGNIFICANT CHANGE UP (ref 3.8–10.5)
WBC # BLD: 10.4 K/UL — SIGNIFICANT CHANGE UP (ref 3.8–10.5)
WBC # BLD: 11.2 K/UL — HIGH (ref 3.8–10.5)
WBC # BLD: 8.91 K/UL — SIGNIFICANT CHANGE UP (ref 3.8–10.5)
WBC # FLD AUTO: 10.32 K/UL — SIGNIFICANT CHANGE UP (ref 3.8–10.5)
WBC # FLD AUTO: 10.4 K/UL — SIGNIFICANT CHANGE UP (ref 3.8–10.5)
WBC # FLD AUTO: 11.2 K/UL — HIGH (ref 3.8–10.5)
WBC # FLD AUTO: 8.91 K/UL — SIGNIFICANT CHANGE UP (ref 3.8–10.5)

## 2022-07-25 RX ORDER — FERROUS SULFATE 325(65) MG
325 TABLET ORAL THREE TIMES A DAY
Refills: 0 | Status: DISCONTINUED | OUTPATIENT
Start: 2022-07-25 | End: 2022-07-26

## 2022-07-25 RX ORDER — SODIUM CHLORIDE 9 MG/ML
1000 INJECTION, SOLUTION INTRAVENOUS ONCE
Refills: 0 | Status: COMPLETED | OUTPATIENT
Start: 2022-07-25 | End: 2022-07-25

## 2022-07-25 RX ORDER — OXYCODONE HYDROCHLORIDE 5 MG/1
5 TABLET ORAL
Refills: 0 | Status: DISCONTINUED | OUTPATIENT
Start: 2022-07-25 | End: 2022-07-26

## 2022-07-25 RX ADMIN — OXYCODONE HYDROCHLORIDE 5 MILLIGRAM(S): 5 TABLET ORAL at 18:30

## 2022-07-25 RX ADMIN — Medication 325 MILLIGRAM(S): at 19:46

## 2022-07-25 RX ADMIN — Medication 975 MILLIGRAM(S): at 21:40

## 2022-07-25 RX ADMIN — OXYCODONE HYDROCHLORIDE 5 MILLIGRAM(S): 5 TABLET ORAL at 17:45

## 2022-07-25 RX ADMIN — Medication 600 MILLIGRAM(S): at 18:30

## 2022-07-25 RX ADMIN — Medication 975 MILLIGRAM(S): at 10:21

## 2022-07-25 RX ADMIN — Medication 600 MILLIGRAM(S): at 17:45

## 2022-07-25 RX ADMIN — Medication 975 MILLIGRAM(S): at 02:38

## 2022-07-25 RX ADMIN — Medication 975 MILLIGRAM(S): at 17:30

## 2022-07-25 RX ADMIN — SIMETHICONE 80 MILLIGRAM(S): 80 TABLET, CHEWABLE ORAL at 02:41

## 2022-07-25 RX ADMIN — SIMETHICONE 80 MILLIGRAM(S): 80 TABLET, CHEWABLE ORAL at 15:43

## 2022-07-25 RX ADMIN — HEPARIN SODIUM 5000 UNIT(S): 5000 INJECTION INTRAVENOUS; SUBCUTANEOUS at 17:47

## 2022-07-25 RX ADMIN — SIMETHICONE 80 MILLIGRAM(S): 80 TABLET, CHEWABLE ORAL at 21:40

## 2022-07-25 RX ADMIN — Medication 975 MILLIGRAM(S): at 16:49

## 2022-07-25 RX ADMIN — Medication 500 MILLIGRAM(S): at 19:46

## 2022-07-25 RX ADMIN — SODIUM CHLORIDE 1000 MILLILITER(S): 9 INJECTION, SOLUTION INTRAVENOUS at 10:00

## 2022-07-25 RX ADMIN — HEPARIN SODIUM 5000 UNIT(S): 5000 INJECTION INTRAVENOUS; SUBCUTANEOUS at 05:51

## 2022-07-25 RX ADMIN — Medication 975 MILLIGRAM(S): at 11:00

## 2022-07-25 RX ADMIN — Medication 600 MILLIGRAM(S): at 06:21

## 2022-07-25 RX ADMIN — Medication 600 MILLIGRAM(S): at 00:28

## 2022-07-25 RX ADMIN — Medication 975 MILLIGRAM(S): at 22:38

## 2022-07-25 RX ADMIN — Medication 975 MILLIGRAM(S): at 03:08

## 2022-07-25 RX ADMIN — Medication 600 MILLIGRAM(S): at 05:51

## 2022-07-25 NOTE — PROGRESS NOTE ADULT - ASSESSMENT
A/P: 27y  POD#2 from pLTCS for cat 2 tracing c/b post-partum bleeding of approximately 350cc after a QBL of 254cc.    #Post-partum bleeding  Pt had a PP bleed of 350cc after a QBL of 254cc. Hct dropped from 37.9->23.8->22.4.  - s/p IM methergine and methergine series  - Patient continues to be asymptomatic w/ reassuring VS  - Consider blood transfusion if pt becomes symptomatic  - f/u AM CBC    #Postpartum state  - Continue motrin, tylenol, oxycodone PRN for pain control.  - Encourage ambulation  - Continue regular diet  - DVT prophylaxis with 5000u Heparin    Taya Love  PGY-1 A/P: 27y  POD#2 from pLTCS for cat 2 tracing c/b post-partum bleeding of approximately 350cc after a QBL of 254cc.    #Post-partum bleeding  Pt had a PP bleed of 350cc after a QBL of 254cc. Hct dropped from 37.9->23.8->22.4->20.1  - s/p IM methergine and methergine series  - Patient is symptomatic w/ dizziness, weakness, seeing stars and chills  - Plan for 1uPRBC  - f/u CBC after transfusion    #Postpartum state  - Continue motrin, tylenol, oxycodone PRN for pain control.  - Encourage ambulation  - Continue regular diet  - DVT prophylaxis with 5000u Heparin    Taya Love  PGY-1

## 2022-07-25 NOTE — CHART NOTE - NSCHARTNOTEFT_GEN_A_CORE
2205    House officer at the bedside to evaluate patient after being notified regarding patient's bleeding by RN. Patient has bled through 2 pads w/ some grape sized clots. Patient denies any chest pain, shortness of breath, light-headedness, or dizziness. Voiding spontaneously. Has been ambulating and OOB    Gen: No acute distress. Awake. Alert  Pulm: No respiratory distress. Unlabored breathing   Abd: Soft. Non-tender. Fundus is firm. Pfannenstiel w/ overlying pressure dressing c/d/i  : 2 soaked pads  Extremities: No calf tenderness bilaterally. 1+ pitting edema    ICU Vital Signs Last 24 Hrs  T(C): 36.8 (23 Jul 2022 21:25), Max: 37 (23 Jul 2022 10:10)  T(F): 98.3 (23 Jul 2022 21:25), Max: 98.6 (23 Jul 2022 10:10)  HR: 74 (23 Jul 2022 21:25) (60 - 99)  BP: 115/59 (23 Jul 2022 21:25) (105/68 - 147/85)  BP(mean): 84 (23 Jul 2022 12:30) (73 - 93)  ABP: --  ABP(mean): --  RR: 18 (23 Jul 2022 21:25) (16 - 24)  SpO2: 100% (23 Jul 2022 21:25) (82% - 100%)    O2 Parameters below as of 23 Jul 2022 21:25  Patient On (Oxygen Delivery Method): room air    ~00:01 (7/24/2022) Patient re-evaluated after being notified by RN that she had bled through another pad. Patient continues to feel asx  - 1 soaked pad seen   - Bedside sono: Bladder full. Unable to thoroughly visualize uterus given bandage  - Abdomen: Soft. Non-tender. Slow trickle of blood w/ palpation of uterus. Uterus firm. Bimanual exam performed w/ clots noted in the lower uterine segment, partially evacuated given patient's discomfort with exam    A/P: Pt POD#1 from uncomplicated pLTCS () for cat 2 tracing with persistent post-partum bleeding (EBL ~350cc total). Patient with reassuring VS and asx.  - Will continue to monitor bleeding after bimanual exam and after having patient void   - Stat CBC ordered  - Will given IM Methergine followed by a Methergine series    Sukumar Gutierrez  PGY-1, Obstetrics & Gynecology     d/w Dr. Calvillo
I was notified by Nurse Midwife David at 1030am escalation regarding patients CBC that resulted at 5am of 6.4/20.1, An order for 1unit PRBC was placed at 950am with a rpt CBC in 4hrs    PT is PP P/C/S for arrest, cat II FHRT, remote from delivery with an initial EML of 500cc, followed by PPH of 350cc. Pt is s/p Methergine, and Methergine series PO.     CBC on 7/24 was stable at 7.2/22 followed by 7.3/22.4 and patient was asymptomatic, skin was pink, and pt denied dizziness lightheaded ness or tachycardia    Today I notified the ANM to inquire about the Blood tranfusion and was informed the patient had lost her IV access and the blood has not been released. I informed the ANM, Nurse Manager, and RN Julia to start transfusion ASAP and infuse over 1.5-2hrs per Postpartum floor protocol. I also Informed the staff of the updated Protocol for Low H&H and EBL to ensure blood is given over this amount of time, and rpt CBC in 30min. I discussed this plan with Dr Tompkins to make her aware of the updated protocols and it was advised to give the second unit.     an additional CBC was placed to rpt after unit #2, and then will rpt CBC again 4hrs post 2units PRBCS    Vital Signs Last 24 Hrs  T(C): 37 (25 Jul 2022 10:44), Max: 37 (25 Jul 2022 10:44)  T(F): 98.6 (25 Jul 2022 10:44), Max: 98.6 (25 Jul 2022 10:44)  HR: 78 (25 Jul 2022 10:44) (78 - 91)  BP: 101/56 (25 Jul 2022 10:44) (101/56 - 122/58)  BP(mean): --  RR: 17 (25 Jul 2022 10:44) (17 - 18)  SpO2: 100% (25 Jul 2022 10:44) (99% - 100%)    Parameters below as of 25 Jul 2022 10:44  Patient On (Oxygen Delivery Method): room air    PT appears tired and pale today, PT reports dizziness with ambulation,. Sleeping but arousable, Denies pain at this time although abd approp tender with palpation    Fundus is firm, 1 finger breathe below umbilicus,. bleeding is scant at this time  IV is now infusing w 20g, bolus infusing  Lungs are CTA bilaterally  NO worsening swelling noted to extremities  Venodynes in place while in bed  Strict I&O encouraged   Monitor vital signs per transfusion protocol  Cont to monitor CBC   Type and Screen reordered today    Continue close monitoring   Discussed plan with Dr Turner grey AGNP-c  [phone]
NP note    sp AROM, FHR decelerations present  RN having difficult time picking up ctx pattern via TOCO  IUPC placed  Patient repositioned to left lateral  Amnioinfusion to be initiated if variable decelerations are present  Will reassess PRN    Yayo NP
Patient seen at bedside   Tracing category 1   [place]: irregular   VE: 3.5/60/-3   Transition to pitocin   Patient requesting epidural
Patient resting with epidural in place   Tracing category 1  [place]: irregular   VE: unchanged   Pitocin at 6   Continue to titrate pitocin   Reassess as needed

## 2022-07-25 NOTE — PROGRESS NOTE ADULT - SUBJECTIVE AND OBJECTIVE BOX
R1 Progress Note    Patient seen and examined at bedside, no acute overnight events. No acute complaints, pain well controlled. Patient is ambulating and tolerating regular diet. Has not yet passed flatus. Gilbert is still in place. Denies CP, SOB, N/V, HA, blurred vision, epigastric pain.    Vital Signs Last 24 Hours  T(C): 36.9 (07-24-22 @ 22:00), Max: 36.9 (07-24-22 @ 05:04)  HR: 85 (07-24-22 @ 22:00) (79 - 91)  BP: 122/58 (07-24-22 @ 22:00) (103/52 - 122/58)  RR: 18 (07-24-22 @ 22:00) (18 - 18)  SpO2: 99% (07-24-22 @ 22:00) (99% - 100%)    I&O's Summary    23 Jul 2022 07:01  -  24 Jul 2022 07:00  --------------------------------------------------------  IN: 2100 mL / OUT: 2647 mL / NET: -547 mL        Physical Exam:  General: NAD  Abdomen: Soft, non-tender, non-distended, fundus firm  Incision: Pfannenstiel incision CDI, subcuticular suture closure  Pelvic: Lochia wnl    Labs:    Blood Type: O Positive  Antibody Screen: Negative  RPR: Negative               7.3    10.90 )-----------( 143      ( 07-24 @ 12:01 )             22.4                7.2    12.60 )-----------( 131      ( 07-24 @ 05:50 )             22.3                8.1    14.06 )-----------( 143      ( 07-24 @ 00:35 )             23.8         MEDICATIONS  (STANDING):  acetaminophen     Tablet .. 975 milliGRAM(s) Oral <User Schedule>  ascorbic acid 500 milliGRAM(s) Oral daily  diphtheria/tetanus/pertussis (acellular) Vaccine (ADAcel) 0.5 milliLiter(s) IntraMuscular once  ferrous    sulfate 325 milliGRAM(s) Oral daily  heparin   Injectable 5000 Unit(s) SubCutaneous every 12 hours  ibuprofen  Tablet. 600 milliGRAM(s) Oral every 6 hours  lactated ringers. 1000 milliLiter(s) (125 mL/Hr) IV Continuous <Continuous>  oxytocin Infusion 333.333 milliUNIT(s)/Min (1000 mL/Hr) IV Continuous <Continuous>  oxytocin Infusion 333.333 milliUNIT(s)/Min (1000 mL/Hr) IV Continuous <Continuous>  oxytocin Infusion 111 milliUNIT(s)/Min (333 mL/Hr) IV Continuous <Continuous>    MEDICATIONS  (PRN):  diphenhydrAMINE 25 milliGRAM(s) Oral every 6 hours PRN Pruritus  lanolin Ointment 1 Application(s) Topical every 6 hours PRN Sore Nipples  magnesium hydroxide Suspension 30 milliLiter(s) Oral two times a day PRN Constipation  oxyCODONE    IR 5 milliGRAM(s) Oral every 3 hours PRN Moderate to Severe Pain (4-10)  oxyCODONE    IR 5 milliGRAM(s) Oral once PRN Moderate to Severe Pain (4-10)  simethicone 80 milliGRAM(s) Chew every 4 hours PRN Gas   R1 Progress Note    Patient seen and examined at bedside, no acute overnight events. Patient is feeling some lightheadedness/dizziness when she walks around and is occasionally seeing stars. She reports that she feels weak and has been very cold overnight. Pain well controlled. Patient is ambulating and tolerating regular diet. Has not yet had a bowel movement but is passing flatus and voiding indepedently. Denies CP, SOB, N/V, HA, blurred vision, epigastric pain.    Vital Signs Last 24 Hours  T(C): 36.9 (07-24-22 @ 22:00), Max: 36.9 (07-24-22 @ 05:04)  HR: 85 (07-24-22 @ 22:00) (79 - 91)  BP: 122/58 (07-24-22 @ 22:00) (103/52 - 122/58)  RR: 18 (07-24-22 @ 22:00) (18 - 18)  SpO2: 99% (07-24-22 @ 22:00) (99% - 100%)    I&O's Summary    23 Jul 2022 07:01  -  24 Jul 2022 07:00  --------------------------------------------------------  IN: 2100 mL / OUT: 2647 mL / NET: -547 mL      Physical Exam:  General: NAD  Abdomen: Soft, non-tender, non-distended, fundus firm  Incision: Pfannenstiel incision CDI, subcuticular suture closure  Pelvic: Lochia wnl    Labs:    Blood Type: O Positive  Antibody Screen: Negative  RPR: Negative               7.3    10.90 )-----------( 143      ( 07-24 @ 12:01 )             22.4                7.2    12.60 )-----------( 131      ( 07-24 @ 05:50 )             22.3                8.1    14.06 )-----------( 143      ( 07-24 @ 00:35 )             23.8         MEDICATIONS  (STANDING):  acetaminophen     Tablet .. 975 milliGRAM(s) Oral <User Schedule>  ascorbic acid 500 milliGRAM(s) Oral daily  diphtheria/tetanus/pertussis (acellular) Vaccine (ADAcel) 0.5 milliLiter(s) IntraMuscular once  ferrous    sulfate 325 milliGRAM(s) Oral daily  heparin   Injectable 5000 Unit(s) SubCutaneous every 12 hours  ibuprofen  Tablet. 600 milliGRAM(s) Oral every 6 hours  lactated ringers. 1000 milliLiter(s) (125 mL/Hr) IV Continuous <Continuous>  oxytocin Infusion 333.333 milliUNIT(s)/Min (1000 mL/Hr) IV Continuous <Continuous>  oxytocin Infusion 333.333 milliUNIT(s)/Min (1000 mL/Hr) IV Continuous <Continuous>  oxytocin Infusion 111 milliUNIT(s)/Min (333 mL/Hr) IV Continuous <Continuous>    MEDICATIONS  (PRN):  diphenhydrAMINE 25 milliGRAM(s) Oral every 6 hours PRN Pruritus  lanolin Ointment 1 Application(s) Topical every 6 hours PRN Sore Nipples  magnesium hydroxide Suspension 30 milliLiter(s) Oral two times a day PRN Constipation  oxyCODONE    IR 5 milliGRAM(s) Oral every 3 hours PRN Moderate to Severe Pain (4-10)  oxyCODONE    IR 5 milliGRAM(s) Oral once PRN Moderate to Severe Pain (4-10)  simethicone 80 milliGRAM(s) Chew every 4 hours PRN Gas

## 2022-07-26 VITALS
HEART RATE: 79 BPM | OXYGEN SATURATION: 100 % | SYSTOLIC BLOOD PRESSURE: 115 MMHG | DIASTOLIC BLOOD PRESSURE: 74 MMHG | RESPIRATION RATE: 17 BRPM | TEMPERATURE: 99 F

## 2022-07-26 LAB
HCT VFR BLD CALC: 28.4 % — LOW (ref 34.5–45)
HGB BLD-MCNC: 9.4 G/DL — LOW (ref 11.5–15.5)
MCHC RBC-ENTMCNC: 29.6 PG — SIGNIFICANT CHANGE UP (ref 27–34)
MCHC RBC-ENTMCNC: 33.1 GM/DL — SIGNIFICANT CHANGE UP (ref 32–36)
MCV RBC AUTO: 89.3 FL — SIGNIFICANT CHANGE UP (ref 80–100)
NRBC # BLD: 0 /100 WBCS — SIGNIFICANT CHANGE UP
NRBC # FLD: 0.04 K/UL — HIGH
PLATELET # BLD AUTO: 154 K/UL — SIGNIFICANT CHANGE UP (ref 150–400)
RBC # BLD: 3.18 M/UL — LOW (ref 3.8–5.2)
RBC # FLD: 14.5 % — SIGNIFICANT CHANGE UP (ref 10.3–14.5)
WBC # BLD: 10.07 K/UL — SIGNIFICANT CHANGE UP (ref 3.8–10.5)
WBC # FLD AUTO: 10.07 K/UL — SIGNIFICANT CHANGE UP (ref 3.8–10.5)

## 2022-07-26 RX ADMIN — Medication 325 MILLIGRAM(S): at 06:11

## 2022-07-26 RX ADMIN — OXYCODONE HYDROCHLORIDE 5 MILLIGRAM(S): 5 TABLET ORAL at 12:39

## 2022-07-26 RX ADMIN — HEPARIN SODIUM 5000 UNIT(S): 5000 INJECTION INTRAVENOUS; SUBCUTANEOUS at 06:11

## 2022-07-26 RX ADMIN — Medication 325 MILLIGRAM(S): at 12:39

## 2022-07-26 RX ADMIN — Medication 975 MILLIGRAM(S): at 10:15

## 2022-07-26 RX ADMIN — Medication 600 MILLIGRAM(S): at 00:38

## 2022-07-26 RX ADMIN — Medication 975 MILLIGRAM(S): at 03:50

## 2022-07-26 RX ADMIN — Medication 1 TABLET(S): at 12:39

## 2022-07-26 RX ADMIN — Medication 600 MILLIGRAM(S): at 06:11

## 2022-07-26 RX ADMIN — Medication 500 MILLIGRAM(S): at 12:39

## 2022-07-26 RX ADMIN — MAGNESIUM HYDROXIDE 30 MILLILITER(S): 400 TABLET, CHEWABLE ORAL at 00:10

## 2022-07-26 RX ADMIN — SIMETHICONE 80 MILLIGRAM(S): 80 TABLET, CHEWABLE ORAL at 03:11

## 2022-07-26 RX ADMIN — OXYCODONE HYDROCHLORIDE 5 MILLIGRAM(S): 5 TABLET ORAL at 13:30

## 2022-07-26 RX ADMIN — Medication 975 MILLIGRAM(S): at 03:11

## 2022-07-26 RX ADMIN — Medication 600 MILLIGRAM(S): at 00:03

## 2022-07-26 RX ADMIN — Medication 975 MILLIGRAM(S): at 09:32

## 2022-07-26 NOTE — PROGRESS NOTE ADULT - SUBJECTIVE AND OBJECTIVE BOX
R1 Progress Note    Patient seen and examined at bedside, no acute overnight events. No acute complaints, pain well controlled. Patient is ambulating and tolerating regular diet. Has not yet passed flatus. Gilbert is still in place. Denies CP, SOB, N/V, HA, blurred vision, epigastric pain.    Vital Signs Last 24 Hours  T(C): 36.8 (07-26-22 @ 01:44), Max: 37 (07-25-22 @ 10:44)  HR: 80 (07-26-22 @ 01:44) (73 - 96)  BP: 109/67 (07-26-22 @ 01:44) (101/56 - 134/88)  RR: 18 (07-26-22 @ 01:44) (16 - 18)  SpO2: 100% (07-26-22 @ 01:44) (99% - 100%)    I&O's Summary      Physical Exam:  General: NAD  Abdomen: Soft, non-tender, non-distended, fundus firm  Incision: Pfannenstiel incision CDI, subcuticular suture closure  Pelvic: Lochia wnl    Labs:    Blood Type: O Positive  Antibody Screen: Negative  RPR: Negative               8.9    11.20 )-----------( 161      ( 07-25 @ 21:38 )             26.1                9.6    10.40 )-----------( 156      ( 07-25 @ 18:21 )             28.7                8.5    10.32 )-----------( 163      ( 07-25 @ 14:00 )             26.4       MEDICATIONS  (STANDING):  acetaminophen     Tablet .. 975 milliGRAM(s) Oral <User Schedule>  ascorbic acid 500 milliGRAM(s) Oral daily  diphtheria/tetanus/pertussis (acellular) Vaccine (ADAcel) 0.5 milliLiter(s) IntraMuscular once  ferrous    sulfate 325 milliGRAM(s) Oral three times a day  heparin   Injectable 5000 Unit(s) SubCutaneous every 12 hours  ibuprofen  Tablet. 600 milliGRAM(s) Oral every 6 hours  lactated ringers. 1000 milliLiter(s) (125 mL/Hr) IV Continuous <Continuous>  prenatal multivitamin 1 Tablet(s) Oral daily    MEDICATIONS  (PRN):  diphenhydrAMINE 25 milliGRAM(s) Oral every 6 hours PRN Pruritus  lanolin Ointment 1 Application(s) Topical every 6 hours PRN Sore Nipples  magnesium hydroxide Suspension 30 milliLiter(s) Oral two times a day PRN Constipation  oxyCODONE    IR 5 milliGRAM(s) Oral once PRN Moderate to Severe Pain (4-10)  oxyCODONE    IR 5 milliGRAM(s) Oral every 3 hours PRN Moderate to Severe Pain (4-10)  simethicone 80 milliGRAM(s) Chew every 4 hours PRN Gas   R1 Progress Note    Patient seen and examined at bedside, no acute overnight events. No acute complaints, pain well controlled. Patient is ambulating and tolerating regular diet. Has passed flatus and is voiding independently. Denies CP, SOB, N/V, HA, blurred vision, epigastric pain. The pt is feeling significantly improved since receiving 2 units yesterday and reports that her lightheadedness, fatigue, and "seeing stars" has resolved.    Vital Signs Last 24 Hours  T(C): 36.8 (07-26-22 @ 01:44), Max: 37 (07-25-22 @ 10:44)  HR: 80 (07-26-22 @ 01:44) (73 - 96)  BP: 109/67 (07-26-22 @ 01:44) (101/56 - 134/88)  RR: 18 (07-26-22 @ 01:44) (16 - 18)  SpO2: 100% (07-26-22 @ 01:44) (99% - 100%)    I&O's Summary      Physical Exam:  General: NAD  Abdomen: Soft, non-tender, non-distended, fundus firm  Incision: Pfannenstiel incision CDI, subcuticular suture closure  Pelvic: Lochia wnl    Labs:    Blood Type: O Positive  Antibody Screen: Negative  RPR: Negative               8.9    11.20 )-----------( 161      ( 07-25 @ 21:38 )             26.1                9.6    10.40 )-----------( 156      ( 07-25 @ 18:21 )             28.7                8.5    10.32 )-----------( 163      ( 07-25 @ 14:00 )             26.4       MEDICATIONS  (STANDING):  acetaminophen     Tablet .. 975 milliGRAM(s) Oral <User Schedule>  ascorbic acid 500 milliGRAM(s) Oral daily  diphtheria/tetanus/pertussis (acellular) Vaccine (ADAcel) 0.5 milliLiter(s) IntraMuscular once  ferrous    sulfate 325 milliGRAM(s) Oral three times a day  heparin   Injectable 5000 Unit(s) SubCutaneous every 12 hours  ibuprofen  Tablet. 600 milliGRAM(s) Oral every 6 hours  lactated ringers. 1000 milliLiter(s) (125 mL/Hr) IV Continuous <Continuous>  prenatal multivitamin 1 Tablet(s) Oral daily    MEDICATIONS  (PRN):  diphenhydrAMINE 25 milliGRAM(s) Oral every 6 hours PRN Pruritus  lanolin Ointment 1 Application(s) Topical every 6 hours PRN Sore Nipples  magnesium hydroxide Suspension 30 milliLiter(s) Oral two times a day PRN Constipation  oxyCODONE    IR 5 milliGRAM(s) Oral once PRN Moderate to Severe Pain (4-10)  oxyCODONE    IR 5 milliGRAM(s) Oral every 3 hours PRN Moderate to Severe Pain (4-10)  simethicone 80 milliGRAM(s) Chew every 4 hours PRN Gas

## 2022-07-26 NOTE — PROGRESS NOTE ADULT - ASSESSMENT
A/P: 27y  POD#3 from pLTCS for cat 2 tracing c/b post-partum bleeding of approximately 350cc after a QBL of 254cc.    #Post-partum bleeding  Pt had a PP bleed of 350cc after a QBL of 254cc. Hct trending from 37.9->>>>20.1->1uPRBC->26.4->1uPRBC->28.7->26.1  - s/p IM methergine and methergine series  - Patient is now asymptomatic after 2units of PRBC  - CBC after transfusion, hematocrit elkin appropriately and remained stable    #Postpartum state  - Continue motrin, tylenol, oxycodone PRN for pain control.  - Encourage ambulation  - Continue regular diet  - DVT prophylaxis with 5000u Heparin    Taya Love  PGY-1

## 2022-07-26 NOTE — PROGRESS NOTE ADULT - SUBJECTIVE AND OBJECTIVE BOX
Post-Operative Note, C/S  She is a  27y woman who is now post-operative day: 3    Subjective:  The patient feels well.  She is ambulating.   She is tolerating regular diet.  She denies nausea and vomiting; denies fever.  She is voiding.  Her pain is controlled; incisional pain is appropriate.  She reports normal postpartum bleeding.  She is breastfeeding. She is formula feeding.  States she feels significant improvement after blood transfusion.     Physical exam:    Vital Signs Last 24 Hrs  T(C): 37 (26 Jul 2022 10:50), Max: 37 (25 Jul 2022 13:35)  T(F): 98.6 (26 Jul 2022 10:50), Max: 98.6 (25 Jul 2022 13:35)  HR: 79 (26 Jul 2022 10:50) (70 - 96)  BP: 115/74 (26 Jul 2022 10:50) (105/60 - 134/88)  BP(mean): --  RR: 17 (26 Jul 2022 10:50) (16 - 18)  SpO2: 100% (26 Jul 2022 10:50) (99% - 100%)    Parameters below as of 26 Jul 2022 10:50  Patient On (Oxygen Delivery Method): room air        Gen: NAD  Breast: Soft, nontender, not engorged.  Abdomen: Soft, nontender, no distension , firm uterine fundus at umbilicus.  Incision: C/D/I.  Pelvic: Normal lochia noted  Ext: No calf tenderness    LABS:                        9.4    10.07 )-----------( 154      ( 26 Jul 2022 06:10 )             28.4     ABO Interpretation: O (07-25 @ 15:46)  Rh Interpretation: Positive (07-25 @ 15:46)  Antibody Screen: Negative (07-25 @ 15:46)    Rubella status:     Allergies    No Known Allergies    Intolerances      MEDICATIONS  (STANDING):  acetaminophen     Tablet .. 975 milliGRAM(s) Oral <User Schedule>  ascorbic acid 500 milliGRAM(s) Oral daily  diphtheria/tetanus/pertussis (acellular) Vaccine (ADAcel) 0.5 milliLiter(s) IntraMuscular once  ferrous    sulfate 325 milliGRAM(s) Oral three times a day  heparin   Injectable 5000 Unit(s) SubCutaneous every 12 hours  ibuprofen  Tablet. 600 milliGRAM(s) Oral every 6 hours  lactated ringers. 1000 milliLiter(s) (125 mL/Hr) IV Continuous <Continuous>  prenatal multivitamin 1 Tablet(s) Oral daily    MEDICATIONS  (PRN):  diphenhydrAMINE 25 milliGRAM(s) Oral every 6 hours PRN Pruritus  lanolin Ointment 1 Application(s) Topical every 6 hours PRN Sore Nipples  magnesium hydroxide Suspension 30 milliLiter(s) Oral two times a day PRN Constipation  oxyCODONE    IR 5 milliGRAM(s) Oral once PRN Moderate to Severe Pain (4-10)  oxyCODONE    IR 5 milliGRAM(s) Oral every 3 hours PRN Moderate to Severe Pain (4-10)  simethicone 80 milliGRAM(s) Chew every 4 hours PRN Gas        Assessment and Plan  POD # 3 s/p C/S. S/P 2 units PRBC for acute blood loss anemia.   Doing well.  Cont breast feeding.   Incisional care reviewed.   PP education materials provided.   Iron rx sent through [email or web address] EHR to home pharmacy.   Vitamin C rx sent through [email or web address] EHR to home pharmacy.  RTO OB, [facility] in 2 weeks for routine PP visit.

## 2023-01-21 ENCOUNTER — OFFICE (OUTPATIENT)
Dept: URBAN - METROPOLITAN AREA CLINIC 93 | Facility: CLINIC | Age: 28
Setting detail: OPHTHALMOLOGY
End: 2023-01-21
Payer: COMMERCIAL

## 2023-01-21 VITALS — WEIGHT: 160 LBS | HEIGHT: 61 IN | BODY MASS INDEX: 30.21 KG/M2

## 2023-01-21 DIAGNOSIS — H01.001: ICD-10-CM

## 2023-01-21 DIAGNOSIS — H01.002: ICD-10-CM

## 2023-01-21 DIAGNOSIS — L03.213: ICD-10-CM

## 2023-01-21 DIAGNOSIS — H01.005: ICD-10-CM

## 2023-01-21 DIAGNOSIS — H01.004: ICD-10-CM

## 2023-01-21 DIAGNOSIS — H00.15: ICD-10-CM

## 2023-01-21 PROCEDURE — 99212 OFFICE O/P EST SF 10 MIN: CPT | Performed by: OPHTHALMOLOGY

## 2023-01-21 ASSESSMENT — CONFRONTATIONAL VISUAL FIELD TEST (CVF)
OS_FINDINGS: FULL
OD_FINDINGS: FULL

## 2023-01-21 ASSESSMENT — LID EXAM ASSESSMENTS
OD_BLEPHARITIS: RLL RUL 1+
OS_BLEPHARITIS: LLL LUL 1+

## 2023-01-21 ASSESSMENT — VISUAL ACUITY
OS_BCVA: 20/20
OD_BCVA: 20/20

## 2024-06-27 ENCOUNTER — APPOINTMENT (OUTPATIENT)
Dept: ANTEPARTUM | Facility: CLINIC | Age: 29
End: 2024-06-27

## 2024-07-03 PROBLEM — Z78.9 OTHER SPECIFIED HEALTH STATUS: Chronic | Status: ACTIVE | Noted: 2022-07-21

## 2024-07-22 ENCOUNTER — TRANSCRIPTION ENCOUNTER (OUTPATIENT)
Age: 29
End: 2024-07-22

## 2024-07-22 ENCOUNTER — APPOINTMENT (OUTPATIENT)
Dept: ANTEPARTUM | Facility: CLINIC | Age: 29
End: 2024-07-22
Payer: COMMERCIAL

## 2024-07-22 PROCEDURE — 76815 OB US LIMITED FETUS(S): CPT | Mod: 26

## 2024-07-23 ENCOUNTER — TRANSCRIPTION ENCOUNTER (OUTPATIENT)
Age: 29
End: 2024-07-23

## 2025-03-08 ENCOUNTER — EMERGENCY (EMERGENCY)
Facility: HOSPITAL | Age: 30
LOS: 1 days | Discharge: HOME | End: 2025-03-08
Attending: EMERGENCY MEDICINE | Admitting: EMERGENCY MEDICINE
Payer: COMMERCIAL

## 2025-03-08 VITALS
OXYGEN SATURATION: 100 % | RESPIRATION RATE: 17 BRPM | TEMPERATURE: 98 F | HEART RATE: 67 BPM | DIASTOLIC BLOOD PRESSURE: 54 MMHG | WEIGHT: 145.06 LBS | SYSTOLIC BLOOD PRESSURE: 101 MMHG

## 2025-03-08 DIAGNOSIS — Z98.891 HISTORY OF UTERINE SCAR FROM PREVIOUS SURGERY: Chronic | ICD-10-CM

## 2025-03-08 DIAGNOSIS — Z98.890 OTHER SPECIFIED POSTPROCEDURAL STATES: Chronic | ICD-10-CM

## 2025-03-08 DIAGNOSIS — Z33.2 ENCOUNTER FOR ELECTIVE TERMINATION OF PREGNANCY: Chronic | ICD-10-CM

## 2025-03-08 LAB
ALBUMIN SERPL ELPH-MCNC: 4.2 G/DL — SIGNIFICANT CHANGE UP (ref 3.3–5)
ALP SERPL-CCNC: 86 U/L — SIGNIFICANT CHANGE UP (ref 40–120)
ALT FLD-CCNC: 18 U/L — SIGNIFICANT CHANGE UP (ref 4–33)
ANION GAP SERPL CALC-SCNC: 13 MMOL/L — SIGNIFICANT CHANGE UP (ref 7–14)
APTT BLD: 28.4 SEC — SIGNIFICANT CHANGE UP (ref 24.5–35.6)
AST SERPL-CCNC: 18 U/L — SIGNIFICANT CHANGE UP (ref 4–32)
BASOPHILS # BLD AUTO: 0.03 K/UL — SIGNIFICANT CHANGE UP (ref 0–0.2)
BASOPHILS NFR BLD AUTO: 0.4 % — SIGNIFICANT CHANGE UP (ref 0–2)
BILIRUB SERPL-MCNC: 0.3 MG/DL — SIGNIFICANT CHANGE UP (ref 0.2–1.2)
BUN SERPL-MCNC: 14 MG/DL — SIGNIFICANT CHANGE UP (ref 7–23)
CALCIUM SERPL-MCNC: 9 MG/DL — SIGNIFICANT CHANGE UP (ref 8.4–10.5)
CHLORIDE SERPL-SCNC: 108 MMOL/L — HIGH (ref 98–107)
CO2 SERPL-SCNC: 20 MMOL/L — LOW (ref 22–31)
CREAT SERPL-MCNC: 0.51 MG/DL — SIGNIFICANT CHANGE UP (ref 0.5–1.3)
EGFR: 130 ML/MIN/1.73M2 — SIGNIFICANT CHANGE UP
EOSINOPHIL # BLD AUTO: 0.05 K/UL — SIGNIFICANT CHANGE UP (ref 0–0.5)
EOSINOPHIL NFR BLD AUTO: 0.6 % — SIGNIFICANT CHANGE UP (ref 0–6)
GLUCOSE SERPL-MCNC: 105 MG/DL — HIGH (ref 70–99)
HCG SERPL-ACNC: <1 MIU/ML — SIGNIFICANT CHANGE UP
HCT VFR BLD CALC: 38.5 % — SIGNIFICANT CHANGE UP (ref 34.5–45)
HGB BLD-MCNC: 13 G/DL — SIGNIFICANT CHANGE UP (ref 11.5–15.5)
IANC: 6.47 K/UL — SIGNIFICANT CHANGE UP (ref 1.8–7.4)
IMM GRANULOCYTES NFR BLD AUTO: 0.2 % — SIGNIFICANT CHANGE UP (ref 0–0.9)
INR BLD: 1 RATIO — SIGNIFICANT CHANGE UP (ref 0.85–1.16)
LYMPHOCYTES # BLD AUTO: 1.39 K/UL — SIGNIFICANT CHANGE UP (ref 1–3.3)
LYMPHOCYTES # BLD AUTO: 16.7 % — SIGNIFICANT CHANGE UP (ref 13–44)
MCHC RBC-ENTMCNC: 29.1 PG — SIGNIFICANT CHANGE UP (ref 27–34)
MCHC RBC-ENTMCNC: 33.8 G/DL — SIGNIFICANT CHANGE UP (ref 32–36)
MCV RBC AUTO: 86.1 FL — SIGNIFICANT CHANGE UP (ref 80–100)
MONOCYTES # BLD AUTO: 0.36 K/UL — SIGNIFICANT CHANGE UP (ref 0–0.9)
MONOCYTES NFR BLD AUTO: 4.3 % — SIGNIFICANT CHANGE UP (ref 2–14)
NEUTROPHILS # BLD AUTO: 6.47 K/UL — SIGNIFICANT CHANGE UP (ref 1.8–7.4)
NEUTROPHILS NFR BLD AUTO: 77.8 % — HIGH (ref 43–77)
NRBC # BLD AUTO: 0 K/UL — SIGNIFICANT CHANGE UP (ref 0–0)
NRBC # FLD: 0 K/UL — SIGNIFICANT CHANGE UP (ref 0–0)
NRBC BLD AUTO-RTO: 0 /100 WBCS — SIGNIFICANT CHANGE UP (ref 0–0)
PLATELET # BLD AUTO: 226 K/UL — SIGNIFICANT CHANGE UP (ref 150–400)
POTASSIUM SERPL-MCNC: 3.8 MMOL/L — SIGNIFICANT CHANGE UP (ref 3.5–5.3)
POTASSIUM SERPL-SCNC: 3.8 MMOL/L — SIGNIFICANT CHANGE UP (ref 3.5–5.3)
PROT SERPL-MCNC: 7 G/DL — SIGNIFICANT CHANGE UP (ref 6–8.3)
PROTHROM AB SERPL-ACNC: 11.6 SEC — SIGNIFICANT CHANGE UP (ref 9.9–13.4)
RBC # BLD: 4.47 M/UL — SIGNIFICANT CHANGE UP (ref 3.8–5.2)
RBC # FLD: 13.3 % — SIGNIFICANT CHANGE UP (ref 10.3–14.5)
SODIUM SERPL-SCNC: 141 MMOL/L — SIGNIFICANT CHANGE UP (ref 135–145)
WBC # BLD: 8.32 K/UL — SIGNIFICANT CHANGE UP (ref 3.8–10.5)
WBC # FLD AUTO: 8.32 K/UL — SIGNIFICANT CHANGE UP (ref 3.8–10.5)

## 2025-03-08 PROCEDURE — 70450 CT HEAD/BRAIN W/O DYE: CPT | Mod: 26,59

## 2025-03-08 PROCEDURE — 70498 CT ANGIOGRAPHY NECK: CPT | Mod: 26

## 2025-03-08 PROCEDURE — 99285 EMERGENCY DEPT VISIT HI MDM: CPT

## 2025-03-08 PROCEDURE — 70496 CT ANGIOGRAPHY HEAD: CPT | Mod: 26

## 2025-03-08 PROCEDURE — 93010 ELECTROCARDIOGRAM REPORT: CPT

## 2025-03-08 RX ORDER — ACETAMINOPHEN 500 MG/5ML
650 LIQUID (ML) ORAL ONCE
Refills: 0 | Status: DISCONTINUED | OUTPATIENT
Start: 2025-03-08 | End: 2025-03-11

## 2025-03-08 RX ORDER — METOCLOPRAMIDE HCL 10 MG
10 TABLET ORAL ONCE
Refills: 0 | Status: COMPLETED | OUTPATIENT
Start: 2025-03-08 | End: 2025-03-08

## 2025-03-08 RX ADMIN — Medication 10 MILLIGRAM(S): at 09:50

## 2025-03-08 RX ADMIN — Medication 2 MILLIGRAM(S): at 10:45

## 2025-03-08 RX ADMIN — Medication 2 MILLIGRAM(S): at 09:50

## 2025-03-08 NOTE — ED PROVIDER NOTE - PHYSICAL EXAMINATION
Dr Ghotra  Patient appears uncomfortable, covering her face with a blanket.  ANO x 3.  Extraocular motor intact.  No facial asymmetry no facial droop no slurred speech no meningeal signs.  Supple neck.  Normal finger-to-nose bilaterally.  Normal sensation throughout normal .  Feels too uncomfortable to stand and walk but was able to walk into the emergency department.  Clear lungs bilaterally able to sit up in bed herself.  Abdomen soft and nontender.  Moving all extremities.  No rashes.

## 2025-03-08 NOTE — ED PROVIDER NOTE - PATIENT PORTAL LINK FT
You can access the FollowMyHealth Patient Portal offered by [facility] by registering at the following website: [email or web address]. By joining [facility] [facility] [facility]s FollowMyHealth portal, you will also be able to view your health information using other applications (apps) compatible with our system.

## 2025-03-08 NOTE — ED ADULT NURSE NOTE - CHIEF COMPLAINT QUOTE
pt c/o waking up with headache and dizziness. states she feels "motion sick" and nauseous when she opens her eyes. denies numbness/tingling, no facial asymmetry noted. MD Tomlinson to triage for stroke eval, no code stroke called.

## 2025-03-08 NOTE — ED ADULT NURSE NOTE - OBJECTIVE STATEMENT
Received pt into spot 10[facility], accompanied by sister. C/o worst headache ever rating 10/10 pain scale that started this morning. States pain all on right side of head with dizziness, nausea and light sensitivity. Denies any neck pain or fever/chills. Pt is 7 months postpartum still nursing, Menstrual period has not returned yet. Denies any urinary symptoms, bowel complaints, palpitations, SOB, chest pain, body aches. Pt eval by Dr. Ghotra. #20 gauge angio cath placed to left antecubital saline locked. Morphine 2mg IVP and Reglan 10 mg IVP given for pain. Pending CT scan of head. Pt and sister verbalized understanding of plan of care. Pt also moved to [facility] in intake for possible spinal tap as per Dr. Ghotra.

## 2025-03-08 NOTE — ED ADULT NURSE NOTE - NSFALLRISKINTERV_ED_ALL_ED

## 2025-03-08 NOTE — ED PROVIDER NOTE - PROGRESS NOTE DETAILS
[facility] Ct tech to expedite study pt feeling better, nausea and pain improved. pt returned from ct CT report of the head no acute intracranial hemorrhage no mass effect or midline shift.  CT angio of the head and neck no evidence of stenosis or occlusion no large vessel occlusion, stenosis or vascular abnormality identified no aneurysm identified. pt feeling better. no photophobia no photosensitivity.  Able to sit up stand and ambulate without any difficulty.  Neurologically intact.  Patient feels comfortable going home with sister at bedside who is a nurse.  Gave strict return precautions to return to the emergency department for any worsening of symptoms. Given pt has improved, cta done within 6hours of headache onset, no  LP needed at this time. pt aware. reviewed LP w pt.  Will refer to neurology for outpatient follow-up pt feeling better, nausea and pain improved. pt returned from ct, called radiology for a read.

## 2025-03-08 NOTE — ED PROVIDER NOTE - OBJECTIVE STATEMENT
Dr Ghotra  30yoF no sig pmhx pw sudden onset of headache since 6am. Patient states that she was woken up from sleep with a severe headache located on the right eye and right side of her head, by the right eye associated with nausea and vomiting.  She vomited several times between 6 and 6:30 AM nonbloody nonbilious.  Endorsing nausea.  Some photophobia and photosensitivity.  No neck pain.  No associated numbness or weakness.  Feels unsteady walking  due to the pain.  Does not have headaches normally.  No trauma no recent falls vomiting that she went to bed yesterday.  No chest pain no shortness of breath no abdominal pain.

## 2025-03-08 NOTE — ED ADULT TRIAGE NOTE - CHIEF COMPLAINT QUOTE
pt c/o waking up with headache and dizziness. states she feels "motion sick" and nauseous when she opens her eyes. pt c/o waking up with headache and dizziness. states she feels "motion sick" and nauseous when she opens her eyes. denies numbness/tingling, no facial asymmetry noted. MD Tomlinson to triage for stroke eval, no code stroke called.

## 2025-03-08 NOTE — ED PROVIDER NOTE - CLINICAL SUMMARY MEDICAL DECISION MAKING FREE TEXT BOX
plan NPO< labs, stat urine hcg, ct head, ct angio head/neck, pain med,   DDX headache/migraine. RO SAH given severe sudden headache. spoke with pt. expedited  studies.

## 2025-03-08 NOTE — ED ADULT NURSE REASSESSMENT NOTE - NS ED NURSE REASSESS COMMENT FT1
Dr. Ghotra spoke to pt prior to d/c home. Pt aware Tylenol oral dose and need for vital signs prior to leaving for home. Pt left without vitals and tylenol given. Dr. Ghotra made aware.